# Patient Record
Sex: FEMALE | Race: WHITE | NOT HISPANIC OR LATINO | Employment: OTHER | URBAN - METROPOLITAN AREA
[De-identification: names, ages, dates, MRNs, and addresses within clinical notes are randomized per-mention and may not be internally consistent; named-entity substitution may affect disease eponyms.]

---

## 2021-12-18 ENCOUNTER — HOSPITAL ENCOUNTER (EMERGENCY)
Facility: CLINIC | Age: 51
Discharge: HOME | End: 2021-12-18
Attending: PHYSICIAN ASSISTANT | Admitting: PHYSICIAN ASSISTANT
Payer: MEDICARE

## 2021-12-18 VITALS
OXYGEN SATURATION: 97 % | HEART RATE: 72 BPM | SYSTOLIC BLOOD PRESSURE: 162 MMHG | DIASTOLIC BLOOD PRESSURE: 100 MMHG | TEMPERATURE: 98 F | RESPIRATION RATE: 14 BRPM

## 2021-12-18 DIAGNOSIS — S61.215A LACERATION OF LEFT RING FINGER WITHOUT FOREIGN BODY WITHOUT DAMAGE TO NAIL, INITIAL ENCOUNTER: ICD-10-CM

## 2021-12-18 PROCEDURE — 272N000047 HC ADHESIVE DERMABOND SKIN

## 2021-12-18 PROCEDURE — 12001 RPR S/N/AX/GEN/TRNK 2.5CM/<: CPT

## 2021-12-18 PROCEDURE — 250N000011 HC RX IP 250 OP 636: Performed by: PHYSICIAN ASSISTANT

## 2021-12-18 PROCEDURE — 99282 EMERGENCY DEPT VISIT SF MDM: CPT | Mod: 25

## 2021-12-18 PROCEDURE — 90471 IMMUNIZATION ADMIN: CPT | Performed by: PHYSICIAN ASSISTANT

## 2021-12-18 PROCEDURE — 90715 TDAP VACCINE 7 YRS/> IM: CPT | Performed by: PHYSICIAN ASSISTANT

## 2021-12-18 RX ADMIN — CLOSTRIDIUM TETANI TOXOID ANTIGEN (FORMALDEHYDE INACTIVATED), CORYNEBACTERIUM DIPHTHERIAE TOXOID ANTIGEN (FORMALDEHYDE INACTIVATED), BORDETELLA PERTUSSIS TOXOID ANTIGEN (GLUTARALDEHYDE INACTIVATED), BORDETELLA PERTUSSIS FILAMENTOUS HEMAGGLUTININ ANTIGEN (FORMALDEHYDE INACTIVATED), BORDETELLA PERTUSSIS PERTACTIN ANTIGEN, AND BORDETELLA PERTUSSIS FIMBRIAE 2/3 ANTIGEN 0.5 ML: 5; 2; 2.5; 5; 3; 5 INJECTION, SUSPENSION INTRAMUSCULAR at 21:04

## 2021-12-18 ASSESSMENT — ENCOUNTER SYMPTOMS: WOUND: 1

## 2021-12-19 NOTE — ED TRIAGE NOTES
Pt states cut tip of 4th digit LUE around 1900 while trying to cut securing bands off charging cord. Bleeding controlled with pressure in triage. Pt is deaf,  contacted unknown ETA. CMS intact GCS 15

## 2021-12-19 NOTE — ED PROVIDER NOTES
History     Chief Complaint:  Laceration       HPI   Katelyn Thompson is a 51 year old female who presents to the ED for evaluation of a laceration. Patient notes that she was cutting a piece of cord around 1900 when she accidentally slipped and the distal phalanx of her left 4th finger sustained a laceration. She denies any numbness or tingling. Tetanus 2012.    Patient is right handed.    ROS:  Review of Systems   Skin: Positive for wound.   All other systems reviewed and are negative.       Allergies:  No Known Allergies     Medications:    None    Past Medical History:    Past Medical History:   Diagnosis Date     Hypertension        Past Surgical History:    Past Surgical History:   Procedure Laterality Date     ENT SURGERY       GYN SURGERY      endometreosis     LAPAROSCOPIC BIOPSY LIVER N/A 8/17/2015    Procedure: LAPAROSCOPIC BIOPSY LIVER;  Surgeon: José Agustin MD;  Location: [facility] OR     LAPAROSCOPIC CHOLECYSTECTOMY N/A 8/17/2015    Procedure: LAPAROSCOPIC CHOLECYSTECTOMY;  Surgeon: José Agustin MD;  Location: [facility] OR     wisdom teeth extraction        Social History:   reports that she has never smoked. She has never used smokeless tobacco. She reports that she does not drink alcohol and does not use drugs.    PCP: Janet Cortes     Physical Exam     Patient Vitals for the past 24 hrs:   BP Temp Temp src Pulse Resp SpO2   12/18/21 1944 (!) 162/100 98  F (36.7  C) Oral 72 14 97 %        Physical Exam  HEENT: mmm  Eyes: PERRL B/L  Neck: Supple  CV: Peripheral pulses intact and regular  Resp: Speaking in full sentences without any respiratory distress  Ext:  left Hand  No bony TTP.  0.5cm laceration to pad of distal phalanx of 4th digit  <2 sec cap refill.  Full ROM of fingers.  Sensation intact distally throughout.  Remainder of the skeletal survey is unremarkable  Skin: warm dry well perfused. See above.  Neuro: Alert  Nursing notes and vital signs reviewed.      Emergency Department Course      Procedures     Narrative: Procedure: Laceration Repair        LACERATION:  A superficial clean 0.5 cm laceration.      LOCATION:  Pad of distal phalanx of 4th finger of left hand      FUNCTION:  Distally sensation, circulation, motor and tendon function are intact.      ANESTHESIA:  Digital block using 1% lidocaine total of 2 mLs      PREPARATION:  Irrigation with Tap Water      DEBRIDEMENT:  no debridement      CLOSURE:  Wound was closed with Dermabond    Emergency Department Course:    Reviewed:  I reviewed nursing notes, vitals and past medical history    Assessments:   I obtained history and examined the patient as noted above.     Interventions:  Medications   Tdap (tetanus-diphtheria-acell pertussis) (ADACEL) injection 0.5 mL (0.5 mLs Intramuscular Given 12/18/21 2104)        Disposition:  The patient was discharged to home.     Impression & Plan      Medical Decision Making:  Katelyn Thompson is a 51 year old female who presents for evaluation of a laceration to the left 4th finger.  The wound was carefully evaluated and explored.  The laceration was closed as noted above.  There is no evidence of muscular, tendon, or bony damage with this laceration.  No signs of foreign body.  Possible complications (infection, scarring) were reviewed with the patient.  Follow up with primary care as noted in the discharge section. Discussed reasons to return. All questions answered. Patient discharged to home in stable condition.    Diagnosis:    ICD-10-CM    1. Laceration of left ring finger without foreign body without damage to nail, initial encounter  S61.215A         Discharge Medications:  None    12/18/2021   Denton Barry PA-C     This record was created at least in part using electronic voice recognition software, so please excuse any typographical errors.         Denton Barry PA-C  12/18/21 [id number]

## 2021-12-19 NOTE — DISCHARGE INSTRUCTIONS
Keep wound covered with bandaid.  Avoid placing topical antibiotic or lotions over wound as this will break down the liquid bandaid.

## (undated) RX ORDER — LIDOCAINE HYDROCHLORIDE 10 MG/ML
INJECTION, SOLUTION EPIDURAL; INFILTRATION; INTRACAUDAL; PERINEURAL
Status: DISPENSED
Start: 2021-12-18